# Patient Record
Sex: MALE | Race: WHITE | NOT HISPANIC OR LATINO | Employment: FULL TIME | URBAN - METROPOLITAN AREA
[De-identification: names, ages, dates, MRNs, and addresses within clinical notes are randomized per-mention and may not be internally consistent; named-entity substitution may affect disease eponyms.]

---

## 2018-06-02 ENCOUNTER — OFFICE VISIT (OUTPATIENT)
Dept: URGENT CARE | Facility: CLINIC | Age: 48
End: 2018-06-02
Payer: COMMERCIAL

## 2018-06-02 VITALS
SYSTOLIC BLOOD PRESSURE: 169 MMHG | OXYGEN SATURATION: 99 % | HEART RATE: 85 BPM | TEMPERATURE: 96.4 F | RESPIRATION RATE: 16 BRPM | DIASTOLIC BLOOD PRESSURE: 100 MMHG

## 2018-06-02 DIAGNOSIS — H61.23 BILATERAL IMPACTED CERUMEN: Primary | ICD-10-CM

## 2018-06-02 PROCEDURE — 69209 REMOVE IMPACTED EAR WAX UNI: CPT | Performed by: PHYSICIAN ASSISTANT

## 2018-06-02 PROCEDURE — 99203 OFFICE O/P NEW LOW 30 MIN: CPT | Performed by: PHYSICIAN ASSISTANT

## 2018-06-02 RX ORDER — LISINOPRIL 30 MG/1
TABLET ORAL
COMMUNITY

## 2018-06-02 NOTE — PATIENT INSTRUCTIONS
Cerumen removed from bilateral ears with irrigation  Recommend patient use swimmer's ear over-the-counter to help dry out his ears after the procedure  Recommend over-the-counter Debrox drops to help thin cerumen from both ears on a regular basis

## 2018-06-02 NOTE — PROGRESS NOTES
[facility]    NAME: Taylor Gong is a 50 y o  male  : 1970    MRN: [id number]  DATE: 2018  TIME: 11:49 AM    Assessment and Plan   Bilateral impacted cerumen [H61 23]  1  Bilateral impacted cerumen     Ear cerumen removal  Date/Time: 2018 11:49 AM  Performed by: Monico Tesfaye by: Angelito Azul     Patient location:  Clinic  Consent:     Consent obtained:  Verbal    Consent given by:  Patient    Risks discussed:  Dizziness, incomplete removal and TM perforation    Alternatives discussed:  No treatment  Universal protocol:     Procedure explained and questions answered to patient or proxy's satisfaction: yes      Patient identity confirmed:  Verbally with patient  Procedure details:     Local anesthetic:  None    Location:  L ear and R ear    Procedure type: irrigation      Approach:  External  Post-procedure details:     Complication:  None    Hearing quality:  Improved    Patient tolerance of procedure: Tolerated well, no immediate complications        Patient Instructions     Patient Instructions   Cerumen removed from bilateral ears with irrigation  Recommend patient use swimmer's ear over-the-counter to help dry out his ears after the procedure  Recommend over-the-counter Debrox drops to help thin cerumen from both ears on a regular basis  Chief Complaint     Chief Complaint   Patient presents with    Ear Fullness    Cerumen Impaction       History of Present Illness   28-year-old male here requesting to have his ears cleaned out  He has decreased hearing in his left ear in his left ear feels full  Denies any upper respiratory complaints  Patient has had this happen before  Review of Systems   Review of Systems   Constitutional: Negative for activity change, appetite change, chills, diaphoresis, fatigue, fever and unexpected weight change     HENT: Negative for congestion, ear pain, hearing loss, sinus pressure, sneezing, sore throat and tinnitus  Decreased hearing left ear and ear fullness  Eyes: Negative for photophobia, redness and visual disturbance  Respiratory: Negative for apnea, cough, chest tightness, shortness of breath, wheezing and stridor  Cardiovascular: Negative for chest pain, palpitations and leg swelling  Gastrointestinal: Negative for abdominal distention, abdominal pain, blood in stool, constipation, diarrhea, nausea and vomiting  Endocrine: Negative for cold intolerance, heat intolerance, polydipsia, polyphagia and polyuria  Genitourinary: Negative for difficulty urinating, dysuria, flank pain, hematuria and urgency  Musculoskeletal: Negative for arthralgias, back pain, gait problem, myalgias, neck pain and neck stiffness  Skin: Negative for rash and wound  Neurological: Negative for dizziness, tremors, seizures, syncope, weakness, light-headedness, numbness and headaches  Hematological: Negative for adenopathy  Does not bruise/bleed easily  Psychiatric/Behavioral: Negative for agitation, behavioral problems, confusion and decreased concentration  The patient is not nervous/anxious  All other systems reviewed and are negative  Current Medications     Current Outpatient Prescriptions:     lisinopril (ZESTRIL) 30 mg tablet, Take by mouth, Disp: , Rfl:     Current Allergies     Allergies as of 06/02/2018 - Reviewed 06/02/2018   Allergen Reaction Noted    Penicillins  07/18/2016          The following portions of the patient's history were reviewed and updated as appropriate: allergies, current medications, past family history, past medical history, past social history, past surgical history and problem list    Past Medical History:   Diagnosis Date    Hypertension      No past surgical history on file  No family history on file  Medications have been verified      Objective   /100   Pulse 85   Temp (!) 96 4 °F (35 8 °C)   Resp 16   SpO2 99%      Physical Exam   Physical Exam Constitutional: He appears well-developed and well-nourished  No distress  HENT:   Head: Normocephalic  Right Ear: External ear normal    Left Ear: External ear normal    Nose: Nose normal    Mouth/Throat: Oropharynx is clear and moist  No oropharyngeal exudate  Cerumen impaction bilaterally  TM intact and gray normal bilaterally after irrigation  Neck: Normal range of motion  Neck supple  Cardiovascular: Normal rate, regular rhythm and normal heart sounds  No murmur heard  Pulmonary/Chest: Effort normal and breath sounds normal  No respiratory distress  He has no wheezes  He has no rales  Abdominal: Soft  Bowel sounds are normal  There is no tenderness  Musculoskeletal: Normal range of motion  Lymphadenopathy:     He has no cervical adenopathy  Skin: Skin is warm  No rash noted

## 2019-12-27 ENCOUNTER — OFFICE VISIT (OUTPATIENT)
Dept: URGENT CARE | Facility: CLINIC | Age: 49
End: 2019-12-27
Payer: COMMERCIAL

## 2019-12-27 VITALS
TEMPERATURE: 97.6 F | HEIGHT: 67 IN | HEART RATE: 120 BPM | OXYGEN SATURATION: 96 % | DIASTOLIC BLOOD PRESSURE: 76 MMHG | RESPIRATION RATE: 18 BRPM | SYSTOLIC BLOOD PRESSURE: 122 MMHG | WEIGHT: 170 LBS | BODY MASS INDEX: 26.68 KG/M2

## 2019-12-27 DIAGNOSIS — K52.9 NONINFECTIOUS GASTROENTERITIS, UNSPECIFIED TYPE: Primary | ICD-10-CM

## 2019-12-27 PROCEDURE — 99213 OFFICE O/P EST LOW 20 MIN: CPT | Performed by: PHYSICIAN ASSISTANT

## 2019-12-27 RX ORDER — ALPRAZOLAM 0.25 MG/1
TABLET ORAL
COMMUNITY
Start: 2019-09-26

## 2019-12-27 RX ORDER — ESCITALOPRAM OXALATE 10 MG/1
TABLET ORAL
COMMUNITY
Start: 2019-11-01

## 2019-12-27 NOTE — PROGRESS NOTES
[facility]        NAME: Lauryn Dejesus is a 52 y o  male  : 1970    MRN: [id number]  DATE: 2019  TIME: 8:28 AM    Assessment and Plan   Noninfectious gastroenteritis, unspecified type [K52 9]  1  Noninfectious gastroenteritis, unspecified type           Patient Instructions     Brat diet  Avoid dairy products for 48 hours  Follow up with PCP in 3-5 days  Proceed to  ER if symptoms worsen  Chief Complaint     Chief Complaint   Patient presents with    Diarrhea     C/O abdominal cramping, a lot of gas and abdominal cramping x 2 1/2 days  Pt had 1 episode of vomiting  History of Present Illness       Patient woke up Jenifer Day with a lot of gas abdominal discomfort  Following day he had an episode of vomiting the form stool yesterday he had a formed stool further nausea or vomiting  Then he acquired of watery diarrhea no blood or mucus  Denies any fever chills  He has no appetite but is drinking  No one else in the family has similar symptoms  Review of Systems   Review of Systems   Constitutional: Positive for activity change  Negative for chills and fever  Respiratory: Negative for cough  Gastrointestinal: Positive for abdominal pain and diarrhea  Negative for blood in stool  Musculoskeletal: Negative for myalgias  Skin: Negative for rash  Neurological: Negative for light-headedness  Hematological: Negative for adenopathy           Current Medications       Current Outpatient Medications:     escitalopram (LEXAPRO) 10 mg tablet, , Disp: , Rfl:     lisinopril (ZESTRIL) 30 mg tablet, Take by mouth, Disp: , Rfl:     ALPRAZolam (XANAX) 0 25 mg tablet, , Disp: , Rfl:     Current Allergies     Allergies as of 2019 - Reviewed 2019   Allergen Reaction Noted    Penicillins  2016            The following portions of the patient's history were reviewed and updated as appropriate: allergies, current medications, past family history, past medical history, past social history, past surgical history and problem list      Past Medical History:   Diagnosis Date    Hypertension        History reviewed  No pertinent surgical history  History reviewed  No pertinent family history  Medications have been verified  Objective   /76   Pulse (!) 120   Temp 97 6 °F (36 4 °C) (Tympanic)   Resp 18   Ht 5' 7" (1 702 m)   Wt 77 1 kg (170 lb)   SpO2 96%   BMI 26 63 kg/m²        Physical Exam     Physical Exam   Constitutional: He is oriented to person, place, and time  He appears well-developed and well-nourished  HENT:   Head: Normocephalic and atraumatic  Cardiovascular: Normal rate and regular rhythm  Pulmonary/Chest: Effort normal    Abdominal: Soft  There is tenderness (Mild generalized tenderness  )  Neurological: He is alert and oriented to person, place, and time  Skin: Skin is warm and dry  No rash noted  Psychiatric: He has a normal mood and affect  His behavior is normal    Nursing note and vitals reviewed

## 2019-12-27 NOTE — PATIENT INSTRUCTIONS
Gastroenteritis   AMBULATORY CARE:   Gastroenteritis , or stomach flu, is an infection of the stomach and intestines  It is caused by bacteria, parasites, or viruses  Common symptoms include the following:   · Diarrhea or gas    · Nausea, vomiting, or poor appetite    · Abdominal cramps, pain, or gurgling    · Fever    · Tiredness or weakness    · Headaches or muscle aches with any of the above symptoms  Call 911 for any of the following:   · You have trouble breathing or a very fast pulse  Seek care immediately if:   · You see blood in your diarrhea  · You cannot stop vomiting  · You have not urinated for 12 hours  · You feel like you are going to faint  Contact your healthcare provider if:   · You have a fever  · You continue to vomit or have diarrhea, even after treatment  · You see worms in your diarrhea  · Your mouth or eyes are dry  You are not urinating as much or as often  · You have questions or concerns about your condition or care  Treatment for gastroenteritis  may include medicines to slow or stop your diarrhea or vomiting  You may also need medicines to treat an infection caused by bacteria or a parasite  Manage your symptoms:   · Drink liquids as directed  Ask your healthcare provider how much liquid to drink each day, and which liquids are best for you  You may also need to drink an oral rehydration solution (ORS)  An ORS has the right amounts of sugar, salt, and minerals in water to replace body fluids  · Eat bland foods  When you feel hungry, begin eating soft, bland foods  Examples are bananas, clear soup, potatoes, and applesauce  Do not have dairy products, alcohol, sugary drinks, or drinks with caffeine until you feel better  · Rest as much as possible  Slowly start to do more each day when you begin to feel better  Prevent the spread of germs:  Gastroenteritis can spread easily   Keep yourself, your family, and your surroundings clean to help prevent the spread of gastroenteritis:  · Wash your hands often  Use soap and water  Wash your hands after you use the bathroom, change a child's diapers, or sneeze  Wash your hands before you prepare or eat food  · Clean surfaces and do laundry often  Wash your clothes and towels separately from the rest of the laundry  Clean surfaces in your home with antibacterial  or bleach  · Clean food thoroughly and cook safely  Wash raw vegetables before you cook  Cook meat, fish, and eggs fully  Do not use the same dishes for raw meat as you do for other foods  Refrigerate any leftover food immediately  · Be aware when you camp or travel  Drink only clean water  Do not drink from rivers or lakes unless you purify or boil the water first  When you travel, drink bottled water and do not add ice  Do not eat fruit that has not been peeled  Do not eat raw fish or meat that is not fully cooked  Follow up with your healthcare provider as directed:  Write down your questions so you remember to ask them during your visits  © 2017 [facility] Information is for End User's use only and may not be sold, redistributed or otherwise used for commercial purposes  All illustrations and images included in CareNotes® are the copyrighted property of A D A M , Inc  or Ervin Wild  The above information is an  only  It is not intended as medical advice for individual conditions or treatments  Talk to your doctor, nurse or pharmacist before following any medical regimen to see if it is safe and effective for you

## 2020-02-27 ENCOUNTER — APPOINTMENT (OUTPATIENT)
Dept: RADIOLOGY | Facility: CLINIC | Age: 50
End: 2020-02-27
Payer: COMMERCIAL

## 2020-02-27 ENCOUNTER — TRANSCRIBE ORDERS (OUTPATIENT)
Dept: URGENT CARE | Facility: CLINIC | Age: 50
End: 2020-02-27

## 2020-02-27 DIAGNOSIS — M54.9 BACK PAIN, UNSPECIFIED BACK LOCATION, UNSPECIFIED BACK PAIN LATERALITY, UNSPECIFIED CHRONICITY: ICD-10-CM

## 2020-02-27 DIAGNOSIS — M54.9 BACK PAIN, UNSPECIFIED BACK LOCATION, UNSPECIFIED BACK PAIN LATERALITY, UNSPECIFIED CHRONICITY: Primary | ICD-10-CM

## 2020-02-27 PROCEDURE — 72100 X-RAY EXAM L-S SPINE 2/3 VWS: CPT

## 2020-06-25 ENCOUNTER — TRANSCRIBE ORDERS (OUTPATIENT)
Dept: LAB | Facility: MEDICAL CENTER | Age: 50
End: 2020-06-25

## 2020-06-25 ENCOUNTER — APPOINTMENT (OUTPATIENT)
Dept: LAB | Facility: MEDICAL CENTER | Age: 50
End: 2020-06-25
Payer: COMMERCIAL

## 2020-06-25 DIAGNOSIS — Z20.828 EXPOSURE TO SARS-ASSOCIATED CORONAVIRUS: Primary | ICD-10-CM

## 2020-06-25 DIAGNOSIS — Z20.828 EXPOSURE TO SARS-ASSOCIATED CORONAVIRUS: ICD-10-CM

## 2020-06-25 PROCEDURE — 86769 SARS-COV-2 COVID-19 ANTIBODY: CPT

## 2020-06-25 PROCEDURE — 36415 COLL VENOUS BLD VENIPUNCTURE: CPT

## 2020-06-26 LAB — SARS-COV-2 IGG+IGM SERPL QL IA: NORMAL

## 2021-07-12 ENCOUNTER — HOSPITAL ENCOUNTER (EMERGENCY)
Facility: HOSPITAL | Age: 51
Discharge: HOME | End: 2021-07-12
Attending: EMERGENCY MEDICINE
Payer: COMMERCIAL

## 2021-07-12 VITALS
HEIGHT: 67 IN | HEART RATE: 89 BPM | BODY MASS INDEX: 28.25 KG/M2 | RESPIRATION RATE: 16 BRPM | DIASTOLIC BLOOD PRESSURE: 103 MMHG | OXYGEN SATURATION: 99 % | SYSTOLIC BLOOD PRESSURE: 195 MMHG | TEMPERATURE: 97.1 F | WEIGHT: 180 LBS

## 2021-07-12 DIAGNOSIS — K04.7 DENTAL INFECTION: Primary | ICD-10-CM

## 2021-07-12 DIAGNOSIS — R59.1 LYMPHADENOPATHY: ICD-10-CM

## 2021-07-12 PROCEDURE — 99283 EMERGENCY DEPT VISIT LOW MDM: CPT

## 2021-07-12 PROCEDURE — 99284 EMERGENCY DEPT VISIT MOD MDM: CPT | Performed by: EMERGENCY MEDICINE

## 2021-07-12 RX ORDER — CLINDAMYCIN HYDROCHLORIDE 300 MG/1
300 CAPSULE ORAL 4 TIMES DAILY
Qty: 40 CAPSULE | Refills: 0 | Status: SHIPPED | OUTPATIENT
Start: 2021-07-12 | End: 2021-07-22

## 2021-07-12 RX ORDER — CLINDAMYCIN HYDROCHLORIDE 150 MG/1
450 CAPSULE ORAL ONCE
Status: COMPLETED | OUTPATIENT
Start: 2021-07-12 | End: 2021-07-12

## 2021-07-12 RX ADMIN — CLINDAMYCIN HYDROCHLORIDE 450 MG: 150 CAPSULE ORAL at 03:48

## 2021-07-12 NOTE — DISCHARGE INSTRUCTIONS
RETURN IF WORSE IN ANY WAY, OR NEW AND CONCERNING SYMPTOMS SIGNS OR SYMPTOMS:  INCREASED PAIN, INCREASED SWELLING, FEVER OR FLU LIKE SYMPTOMS    TREATMENT OPTIONS FOR DENTAL PAIN, WHILE YOU WAIT TO BE SEEN BY YOUR DENTIST:  APPLY ICE AS NEEDED  YOU CAN TAKE TYLENOL AND MOTRIN, AS NEEDED, AND AS DIRECTED (Do not take Motrin if you have Kidney or Ulcer disease; do not take Tylenol if you have Liver disease, use caution as many OTC pain medications contain Tylenol, and use of different or multiple products can cause Tylenol overdose, which can be life threatening)  Orajel, or similar OTC products can be very helpful when applied locally  Peppermint Leaves, Peppermint Extract, or Peppermint Tea: when applied locally have been shown to reduce pain  Vanilla, [place], Or Lemon Extract: when applied to a Q-Tip and applied locally, have been shown to reduce pain  Clove Oil, Tea Tree Oil applied locally can reduce pain  Citrus fruits, when applied locally, can reduce pain    PLEASE CALL YOUR DENTIST TO SET UP AN APPOINTMENT - YOU MAY WANT TO TRY MULTIPLE PRACTICES TO TRY TO OBTAIN AN URGENT EVALUATION    PLEASE CALL YOUR PRIMARY DOCTOR IN THE MORNING TO SET UP FOLLOW UP IF YOU CANNOT BE SEEN BY A DENTIST IN A TIMELY MANNER    PLEASE HAVE YOUR FAMILY DOCTOR RE-EVALUATE YOUR BLOOD PRESSURE, TODAY IT WAS ELEVATED       PATIENT SURVEY:   Thank you for your visit today  Your satisfaction is very very important to us  If you get a survey in the mail please rate your service as VERY GOOD, This helps our team to continue providing excellent care

## 2021-07-13 NOTE — ED PROVIDER NOTES
History  Chief Complaint   Patient presents with    Neck Swelling     started this morning, no assiciated complaints       47 yo M    Here for left submandibular LA and Left lower canine tooth infection   Pt has mild pain due to the LA    No fever/chills  No night sweats  No loss of weight    No other complaints  No cp or sob  No headache or dizziness  No neck pain or stiffness            History provided by:  Patient  Medical Problem  Location:  Lympadenopathy, left lower submandibular region w/ dental abscess  Severity:  Mild  Onset quality:  Gradual  Chronicity:  New  Associated symptoms: no abdominal pain, no chest pain, no congestion, no cough, no diarrhea, no ear pain, no fatigue, no fever, no headaches, no loss of consciousness, no myalgias, no nausea, no rash, no rhinorrhea, no shortness of breath, no sore throat, no vomiting and no wheezing        Prior to Admission Medications   Prescriptions Last Dose Informant Patient Reported? Taking? ALPRAZolam (XANAX) 0 25 mg tablet Not Taking at Unknown time  Yes No   Patient not taking: Reported on 7/12/2021   escitalopram (LEXAPRO) 10 mg tablet 7/11/2021 at Unknown time  Yes Yes   lisinopril (ZESTRIL) 30 mg tablet 7/11/2021 at Unknown time  Yes Yes   Sig: Take by mouth      Facility-Administered Medications: None       Past Medical History:   Diagnosis Date    Hypertension        Past Surgical History:   Procedure Laterality Date    TONSILECTOMY AND ADNOIDECTOMY         History reviewed  No pertinent family history  I have reviewed and agree with the history as documented  E-Cigarette/Vaping     E-Cigarette/Vaping Substances     Social History     Tobacco Use    Smoking status: Current Every Day Smoker     Packs/day: 1 00     Types: Cigarettes    Smokeless tobacco: Never Used   Substance Use Topics    Alcohol use: Never    Drug use: Never       Review of Systems   Constitutional: Negative for fatigue and fever     HENT: Positive for dental problem (left lower canine infection w/ associated LA in the submandibular region )  Negative for congestion, ear pain, rhinorrhea and sore throat  Respiratory: Negative for cough, shortness of breath and wheezing  Cardiovascular: Negative for chest pain  Gastrointestinal: Negative for abdominal pain, diarrhea, nausea and vomiting  Musculoskeletal: Negative for back pain, myalgias, neck pain and neck stiffness  Skin: Negative for rash  Neurological: Negative for dizziness, loss of consciousness and headaches  All other systems reviewed and are negative  Physical Exam  Physical Exam  Constitutional:       General: He is not in acute distress  Appearance: He is well-developed  He is not ill-appearing, toxic-appearing or diaphoretic  HENT:      Head: Normocephalic and atraumatic  Right Ear: External ear normal       Left Ear: External ear normal       Nose: Nose normal       Mouth/Throat:      Mouth: Mucous membranes are moist       Pharynx: Oropharynx is clear  No oropharyngeal exudate or posterior oropharyngeal erythema  Comments: Left lower canine tooth moderate to severe decay  Left submandibular singular enlarged LN, 4mm in size w/o associated erythema   Eyes:      General: No scleral icterus  Right eye: No discharge  Left eye: No discharge  Conjunctiva/sclera: Conjunctivae normal       Pupils: Pupils are equal, round, and reactive to light  Neck:      Vascular: No JVD  Trachea: No tracheal deviation  Cardiovascular:      Rate and Rhythm: Normal rate and regular rhythm  Heart sounds: Normal heart sounds  No murmur heard  No friction rub  No gallop  Pulmonary:      Effort: Pulmonary effort is normal  No respiratory distress  Breath sounds: Normal breath sounds  No stridor  No wheezing, rhonchi or rales  Chest:      Chest wall: No tenderness  Abdominal:      General: Bowel sounds are normal  There is no distension        Palpations: Abdomen is soft  There is no mass  Tenderness: There is no abdominal tenderness  There is no right CVA tenderness, left CVA tenderness, guarding or rebound  Hernia: No hernia is present  Musculoskeletal:         General: No swelling, tenderness, deformity or signs of injury  Normal range of motion  Cervical back: Normal range of motion and neck supple  No rigidity or tenderness  Right lower leg: No edema  Left lower leg: No edema  Lymphadenopathy:      Cervical: No cervical adenopathy  Skin:     General: Skin is warm  Capillary Refill: Capillary refill takes less than 2 seconds  Coloration: Skin is not jaundiced or pale  Findings: No bruising, erythema, lesion or rash  Neurological:      General: No focal deficit present  Mental Status: He is alert and oriented to person, place, and time  Mental status is at baseline  Cranial Nerves: No cranial nerve deficit  Sensory: No sensory deficit  Motor: No weakness or abnormal muscle tone  Coordination: Coordination normal       Gait: Gait normal    Psychiatric:         Mood and Affect: Mood normal          Behavior: Behavior normal          Thought Content:  Thought content normal          Judgment: Judgment normal          Vital Signs  ED Triage Vitals [07/12/21 0331]   Temperature Pulse Respirations Blood Pressure SpO2   (!) 97 1 °F (36 2 °C) 89 16 (!) 195/103 99 %      Temp Source Heart Rate Source Patient Position - Orthostatic VS BP Location FiO2 (%)   Tympanic Monitor Sitting Left arm --      Pain Score       --           Vitals:    07/12/21 0331   BP: (!) 195/103   Pulse: 89   Patient Position - Orthostatic VS: Sitting         Visual Acuity      ED Medications  Medications   clindamycin (CLEOCIN) capsule 450 mg (450 mg Oral Given 7/12/21 8051)       Diagnostic Studies  Results Reviewed     None                 No orders to display              Procedures  Procedures         ED Course  ED Course as of Jul 13 0781   Mon Jul 12, 2021   3101 Pt has dental infection, left lower canine  Has associated LA  Will tx empirically   Pt understands return precautions   He already has plans for dentist f/u       79 770 20 12 Pts BP has been in similar ranges in the past   There are no clinical criteria to tx pt's asymptomatic hypertension   Pt aware that he needs to have f/u w/ PCP for BP check                                               MDM    Disposition  Final diagnoses:   Dental infection   Lymphadenopathy     Time reflects when diagnosis was documented in both MDM as applicable and the Disposition within this note     Time User Action Codes Description Comment    7/12/2021  3:45 AM Ghislainerie Colla Add [K04 7] Dental infection     7/12/2021  3:45 AM Florrie Colla Add [R59 1] Lymphadenopathy       ED Disposition     ED Disposition Condition Date/Time Comment    Discharge Stable Mon Jul 12, 2021  3:45 AM Paula Acevedo discharge to home/self care  Follow-up Information     Follow up With Specialties Details Why Contact Info    Mai Jacob MD [facility] Medicine Call today  [place]io [phone] [facility] [phone]            Discharge Medication List as of 7/12/2021  3:48 AM      START taking these medications    Details   clindamycin (CLEOCIN) 300 MG capsule Take 1 capsule (300 mg total) by mouth 4 (four) times a day for 10 days, Starting Mon 7/12/2021, Until Thu 7/22/2021, Normal         CONTINUE these medications which have NOT CHANGED    Details   escitalopram (LEXAPRO) 10 mg tablet Starting Fri 11/1/2019, Historical Med      lisinopril (ZESTRIL) 30 mg tablet Take by mouth, Historical Med      ALPRAZolam (XANAX) 0 25 mg tablet Starting Thu 9/26/2019, Historical Med           No discharge procedures on file      PDMP Review     None          ED Provider  Electronically Signed by           Amanda Macario MD  07/13/21 0111

## 2021-08-02 ENCOUNTER — APPOINTMENT (OUTPATIENT)
Dept: RADIOLOGY | Facility: CLINIC | Age: 51
End: 2021-08-02
Payer: COMMERCIAL

## 2021-08-02 ENCOUNTER — OFFICE VISIT (OUTPATIENT)
Dept: URGENT CARE | Facility: CLINIC | Age: 51
End: 2021-08-02
Payer: COMMERCIAL

## 2021-08-02 VITALS
RESPIRATION RATE: 20 BRPM | HEART RATE: 104 BPM | SYSTOLIC BLOOD PRESSURE: 164 MMHG | TEMPERATURE: 97.8 F | OXYGEN SATURATION: 96 % | DIASTOLIC BLOOD PRESSURE: 97 MMHG | WEIGHT: 180 LBS | BODY MASS INDEX: 28.19 KG/M2

## 2021-08-02 DIAGNOSIS — M25.512 ACUTE PAIN OF LEFT SHOULDER: ICD-10-CM

## 2021-08-02 DIAGNOSIS — M89.8X1 PAIN OF LEFT SCAPULA: ICD-10-CM

## 2021-08-02 DIAGNOSIS — M25.512 ACUTE PAIN OF LEFT SHOULDER: Primary | ICD-10-CM

## 2021-08-02 PROCEDURE — 99213 OFFICE O/P EST LOW 20 MIN: CPT | Performed by: PHYSICIAN ASSISTANT

## 2021-08-02 PROCEDURE — 73010 X-RAY EXAM OF SHOULDER BLADE: CPT

## 2021-08-02 PROCEDURE — 73030 X-RAY EXAM OF SHOULDER: CPT

## 2021-08-02 RX ORDER — MELOXICAM 15 MG/1
15 TABLET ORAL DAILY
Qty: 14 TABLET | Refills: 0 | Status: SHIPPED | OUTPATIENT
Start: 2021-08-02

## 2021-08-02 RX ORDER — METHOCARBAMOL 750 MG/1
750 TABLET, FILM COATED ORAL EVERY 6 HOURS PRN
Qty: 30 TABLET | Refills: 0 | Status: SHIPPED | OUTPATIENT
Start: 2021-08-02

## 2021-08-02 NOTE — PROGRESS NOTES
[facility]        NAME: Annie Hernandez is a 46 y o  male  : 1970    MRN: [id number]  DATE: 2021  TIME: 8:33 AM    Assessment and Plan   Acute pain of left shoulder [M25 512]  1  Acute pain of left shoulder  XR shoulder 2+ vw left   2  Pain of left scapula  XR scapula left         Patient Instructions       Follow up with PCP in 3-5 days  Proceed to  ER if symptoms worsen  Chief Complaint     Chief Complaint   Patient presents with    Shoulder Pain     Left, fell 2 weeks ago , not improving         History of Present Illness         Patient believes he fell 2 Sundays ago in the parking lot of the convenience store across the street  He fell on to his left side and is complaining of left shoulder pain  States his pain went away for 4 days and returned  States when his pain occurs it is causing neck strain and pain radiates into the proximal aspect of his left upper arm causing "spasms"  Review of Systems   Review of Systems   Constitutional: Negative for chills and fever  Respiratory: Negative for cough  Skin: Negative for rash  Neurological: Negative for weakness and numbness           Current Medications       Current Outpatient Medications:     escitalopram (LEXAPRO) 10 mg tablet, , Disp: , Rfl:     lisinopril (ZESTRIL) 30 mg tablet, Take by mouth, Disp: , Rfl:     ALPRAZolam (XANAX) 0 25 mg tablet, , Disp: , Rfl:     Current Allergies     Allergies as of 2021 - Reviewed 2021   Allergen Reaction Noted    Penicillins  2016            The following portions of the patient's history were reviewed and updated as appropriate: allergies, current medications, past family history, past medical history, past social history, past surgical history and problem list      Past Medical History:   Diagnosis Date    Anxiety     Depression     Hypertension        Past Surgical History:   Procedure Laterality Date    TONSILECTOMY AND ADNOIDECTOMY History reviewed  No pertinent family history  Medications have been verified  Objective   /97   Pulse 104   Temp 97 8 °F (36 6 °C)   Resp 20   Wt 81 6 kg (180 lb)   SpO2 96%   BMI 28 19 kg/m²   No LMP for male patient  Physical Exam     Physical Exam  Vitals and nursing note reviewed  Constitutional:       Appearance: Normal appearance  HENT:      Head: Normocephalic and atraumatic  Cardiovascular:      Rate and Rhythm: Normal rate and regular rhythm  Pulmonary:      Effort: Pulmonary effort is normal    Musculoskeletal:      Comments: Neck without any ecchymosis rashes or wounds  Tenderness of the left trapezius muscle  No palpable spasm  Left shoulder without any swelling ecchymosis rashes or wounds  Full range of motion of left shoulder painful range of motion  Negative empty can sign, able to place his arm behind his back  Tenderness over the posterior aspect of the shoulder into the left scapula  No winged scapula  Tenderness of the left deltoid muscle  No palpable spasm  Skin:     General: Skin is warm  Neurological:      Mental Status: He is alert  Shoulder x-ray -  No acute bony abnormality  Questionable degenerative changes at the [facility] joint  Scapula x-ray -  No acute bony abnormality

## 2021-08-02 NOTE — PATIENT INSTRUCTIONS
Start Mobic and Robaxin as prescribed  If no improvement follow up Orthopedics  Arthralgia   WHAT YOU NEED TO KNOW:   Arthralgia is pain in one or more joints, with no inflammation  It may be short-term and get better within 6 to 8 weeks  Arthralgia can be an early sign of arthritis  Arthralgia may be caused by a medical condition, such as a hormone disorder or a tumor  It may also be caused by an infection or injury  DISCHARGE INSTRUCTIONS:   Medicines: The following medicines may  be ordered for you:  · Acetaminophen  decreases pain  Ask how much to take and how often to take it  Follow directions  Acetaminophen can cause liver damage if not taken correctly  · NSAIDs  decrease pain and prevent swelling  Ask your healthcare provider which medicine is right for you  Ask how much to take and when to take it  Take as directed  NSAIDs can cause stomach bleeding and kidney problems if not taken correctly  · Pain relief cream  decreases pain  Use this cream as directed  · Take your medicine as directed  Contact your healthcare provider if you think your medicine is not helping or if you have side effects  Tell him of her if you are allergic to any medicine  Keep a list of the medicines, vitamins, and herbs you take  Include the amounts, and when and why you take them  Bring the list or the pill bottles to follow-up visits  Carry your medicine list with you in case of an emergency  Follow up with your healthcare provider or specialist as directed:  Write down your questions so you remember to ask them during your visits  Self-care:   · Apply heat  to help decrease pain  Use a heating pad or heat wrap  Apply heat for 20 to 30 minutes every 2 hours for as many days as directed  · Rest  as much as possible  Avoid activities that cause joint pain  · Apply ice  to help decrease swelling and pain  Ice may also help prevent tissue damage  Use an ice pack, or put crushed ice in a plastic bag  Cover it with a towel and place it on your painful joint for 15 to 20 minutes every hour or as directed  · Support  the joint with a brace or elastic wrap as directed  · Elevate  your joint above the level of your heart as often as you can to help decrease swelling and pain  Prop your painful joint on pillows or blankets to keep it elevated comfortably  · Lose weight  if you are overweight  Extra weight can put pressure on your joints and cause more pain  Ask your healthcare provider how much you should weigh  Ask him to help you create a weight loss plan  · Exercise  regularly to help improve joint movement and to decrease pain  Ask about the best exercise plan for you  Low-impact exercises can help take the pressure off your joints  Examples are walking, swimming, and water aerobics  Physical therapy:  A physical therapist teaches you exercises to help improve movement and strength, and to decrease pain  Ask your healthcare provider if physical therapy is right for you  Contact your healthcare provider or specialist if:   · You have a fever  · You continue to have joint pain that cannot be relieved with heat, ice, or medicine  · You have pain and inflammation around your joint  · You have questions or concerns about your condition or care  Return to the emergency department if:   · You have sudden, severe pain when you move your joint  · You have a fever and shaking chills  · You cannot move your joint  · You lose feeling on the side of your body where you have the painful joint  © Copyright [employer] 2021 Information is for End User's use only and may not be sold, redistributed or otherwise used for commercial purposes  All illustrations and images included in CareNotes® are the copyrighted property of [employer]  or Carrie Womack  The above information is an  only   It is not intended as medical advice for individual conditions or treatments  Talk to your doctor, nurse or pharmacist before following any medical regimen to see if it is safe and effective for you

## 2022-08-24 ENCOUNTER — OFFICE VISIT (OUTPATIENT)
Dept: URGENT CARE | Facility: CLINIC | Age: 52
End: 2022-08-24
Payer: COMMERCIAL

## 2022-08-24 ENCOUNTER — APPOINTMENT (EMERGENCY)
Dept: CT IMAGING | Facility: HOSPITAL | Age: 52
End: 2022-08-24
Payer: COMMERCIAL

## 2022-08-24 ENCOUNTER — HOSPITAL ENCOUNTER (EMERGENCY)
Facility: HOSPITAL | Age: 52
Discharge: HOME | End: 2022-08-25
Attending: EMERGENCY MEDICINE
Payer: COMMERCIAL

## 2022-08-24 VITALS
WEIGHT: 180 LBS | SYSTOLIC BLOOD PRESSURE: 158 MMHG | TEMPERATURE: 98 F | DIASTOLIC BLOOD PRESSURE: 98 MMHG | BODY MASS INDEX: 28.19 KG/M2 | RESPIRATION RATE: 20 BRPM | OXYGEN SATURATION: 98 % | HEART RATE: 104 BPM

## 2022-08-24 VITALS
BODY MASS INDEX: 28.25 KG/M2 | RESPIRATION RATE: 18 BRPM | OXYGEN SATURATION: 99 % | HEART RATE: 98 BPM | SYSTOLIC BLOOD PRESSURE: 165 MMHG | HEIGHT: 67 IN | WEIGHT: 180 LBS | DIASTOLIC BLOOD PRESSURE: 108 MMHG

## 2022-08-24 DIAGNOSIS — K08.9 POOR DENTITION: ICD-10-CM

## 2022-08-24 DIAGNOSIS — H60.313 ACUTE DIFFUSE OTITIS EXTERNA OF BOTH EARS: Primary | ICD-10-CM

## 2022-08-24 DIAGNOSIS — R22.1 NECK SWELLING: ICD-10-CM

## 2022-08-24 DIAGNOSIS — H61.23 BILATERAL HEARING LOSS DUE TO CERUMEN IMPACTION: ICD-10-CM

## 2022-08-24 DIAGNOSIS — K11.20 SIALADENITIS: Primary | ICD-10-CM

## 2022-08-24 DIAGNOSIS — R59.0 LYMPHADENOPATHY, ANTERIOR CERVICAL: ICD-10-CM

## 2022-08-24 PROCEDURE — G1004 CDSM NDSC: HCPCS

## 2022-08-24 PROCEDURE — 99213 OFFICE O/P EST LOW 20 MIN: CPT | Performed by: NURSE PRACTITIONER

## 2022-08-24 PROCEDURE — 69209 REMOVE IMPACTED EAR WAX UNI: CPT | Performed by: NURSE PRACTITIONER

## 2022-08-24 PROCEDURE — 69200 CLEAR OUTER EAR CANAL: CPT | Performed by: NURSE PRACTITIONER

## 2022-08-24 PROCEDURE — 99284 EMERGENCY DEPT VISIT MOD MDM: CPT

## 2022-08-24 PROCEDURE — 99284 EMERGENCY DEPT VISIT MOD MDM: CPT | Performed by: EMERGENCY MEDICINE

## 2022-08-24 PROCEDURE — 70490 CT SOFT TISSUE NECK W/O DYE: CPT

## 2022-08-24 PROCEDURE — 70486 CT MAXILLOFACIAL W/O DYE: CPT

## 2022-08-24 NOTE — PATIENT INSTRUCTIONS
You have been given ear drops for bilateral ear canal infection    You are to use debrox drops monthly to prevent future severe impactions  You are NOT to use Q tips  You are to see your PCP for possible blood work related to swollen lymphnode  Go to the ED if symptoms worsen

## 2022-08-24 NOTE — PROGRESS NOTES
[facility]        NAME: Nathan Esqueda is a 46 y o  male  : 1970    MRN: [id number]  DATE: 2022  TIME: 2:51 PM    Assessment and Plan   Acute diffuse otitis externa of both ears [H60 313]  1  Acute diffuse otitis externa of both ears  neomycin-polymyxin-hydrocortisone (CORTISPORIN) otic solution   2  Bilateral hearing loss due to cerumen impaction     3  Lymphadenopathy, anterior cervical           Patient Instructions       Follow up with PCP in 3-5 days  Proceed to  ER if symptoms worsen  You have been given ear drops for bilateral ear canal infection  You are to use debrox drops monthly to prevent future severe impactions  You are NOT to use Q tips  You are to see your PCP for possible blood work related to swollen lymphnode  Go to the ED if symptoms worsen        Chief Complaint     Chief Complaint   Patient presents with    Ear Fullness     Swollen nodes neck area         History of Present Illness       This is a 46year old male who states has bilateral ear fullness for a few days, does admit to cerumen issues and uses Q tips  He states that he also has swollen lymphnodes on the right side of his neck  He states that the swollen lymphnodes usually happen when the ears get blocked  He states that he has no other symptoms  Ear Fullness         Review of Systems   Review of Systems   Constitutional: Negative  HENT: Positive for ear pain  Eyes: Negative  Respiratory: Negative  Cardiovascular: Negative  Gastrointestinal: Negative  Endocrine:        Lymphadenopathy    Genitourinary: Negative  Musculoskeletal: Negative  Skin: Negative  Allergic/Immunologic: Negative  Neurological: Negative  Hematological: Negative  Psychiatric/Behavioral: Negative            Current Medications       Current Outpatient Medications:     lisinopril (ZESTRIL) 30 mg tablet, Take by mouth, Disp: , Rfl:     neomycin-polymyxin-hydrocortisone (CORTISPORIN) otic solution, Administer 4 drops into both ears every 6 (six) hours for 7 days, Disp: 10 mL, Rfl: 0    ALPRAZolam (XANAX) 0 25 mg tablet, , Disp: , Rfl:     escitalopram (LEXAPRO) 10 mg tablet, , Disp: , Rfl:     meloxicam (MOBIC) 15 mg tablet, Take 1 tablet (15 mg total) by mouth daily (Patient not taking: Reported on 8/24/2022), Disp: 14 tablet, Rfl: 0    methocarbamol (ROBAXIN) 750 mg tablet, Take 1 tablet (750 mg total) by mouth every 6 (six) hours as needed for muscle spasms (Patient not taking: Reported on 8/24/2022), Disp: 30 tablet, Rfl: 0    Current Allergies     Allergies as of 08/24/2022 - Reviewed 08/24/2022   Allergen Reaction Noted    Penicillins  07/18/2016            The following portions of the patient's history were reviewed and updated as appropriate: allergies, current medications, past family history, past medical history, past social history, past surgical history and problem list      Past Medical History:   Diagnosis Date    Anxiety     Depression     Hypertension        Past Surgical History:   Procedure Laterality Date    TONSILECTOMY AND ADNOIDECTOMY         History reviewed  No pertinent family history  Medications have been verified  Objective   /98   Pulse 104   Temp 98 °F (36 7 °C)   Resp 20   Wt 81 6 kg (180 lb)   SpO2 98%   BMI 28 19 kg/m²   No LMP for male patient  Physical Exam     Physical Exam  Vitals and nursing note reviewed  Constitutional:       General: He is not in acute distress  Appearance: Normal appearance  He is normal weight  He is not ill-appearing, toxic-appearing or diaphoretic  HENT:      Head: Normocephalic and atraumatic  Right Ear: There is impacted cerumen  Left Ear: There is impacted cerumen  Nose: Nose normal  No congestion or rhinorrhea  Mouth/Throat:      Mouth: Mucous membranes are moist       Pharynx: Oropharynx is clear  No oropharyngeal exudate or posterior oropharyngeal erythema  Eyes:      Extraocular Movements: Extraocular movements intact  Cardiovascular:      Rate and Rhythm: Normal rate and regular rhythm  Pulses: Normal pulses  Heart sounds: Normal heart sounds  Pulmonary:      Effort: Pulmonary effort is normal       Breath sounds: Normal breath sounds  Abdominal:      General: There is no distension  Palpations: Abdomen is soft  Tenderness: There is no abdominal tenderness  Musculoskeletal:         General: Normal range of motion  Cervical back: Normal range of motion and neck supple  Lymphadenopathy:      Cervical: Cervical adenopathy present  Comments: Anterior cervical   lymphnode approximately 5 cm in diameter  Able to visualize when standing looking at pt    Skin:     General: Skin is warm and dry  Capillary Refill: Capillary refill takes less than 2 seconds  Neurological:      General: No focal deficit present  Mental Status: He is alert and oriented to person, place, and time  Psychiatric:         Mood and Affect: Mood normal          Behavior: Behavior normal          Thought Content: Thought content normal          Judgment: Judgment normal          Ear cerumen removal    Date/Time: 8/24/2022 2:40 PM  Performed by: MAYO Antonio  Authorized by: MAYO Antonio   Universal Protocol:  Consent: Verbal consent obtained  Written consent obtained  Risks and benefits: risks, benefits and alternatives were discussed  Consent given by: patient  Time out: Immediately prior to procedure a "time out" was called to verify the correct patient, procedure, equipment, support staff and site/side marked as required    Timeout called at: 8/24/2022 2:40 PM   Patient understanding: patient states understanding of the procedure being performed  Patient consent: the patient's understanding of the procedure matches consent given  Procedure consent: procedure consent matches procedure scheduled  Relevant documents: relevant documents present and verified  Test results: test results available and properly labeled  Site marked: the operative site was marked  Required items: required blood products, implants, devices, and special equipment available  Patient identity confirmed: verbally with patient and hospital-assigned identification number      Patient location:  Clinic  Procedure details:     Local anesthetic:  None    Location:  L ear and R ear    Procedure type: irrigation only      Procedure type comment:  Curette     Approach:  External    Visualization (free text):  Copious amounts of cerumen impacted   Post-procedure details:     Complication:  None    Hearing quality:  Improved    Patient tolerance of procedure:   Tolerated well, no immediate complications  Comments:      Copious amounts of cerumen removed from both ears  Canals are infected

## 2022-08-25 ENCOUNTER — APPOINTMENT (EMERGENCY)
Dept: CT IMAGING | Facility: HOSPITAL | Age: 52
End: 2022-08-25
Payer: COMMERCIAL

## 2022-08-25 LAB
BASOPHILS # BLD MANUAL: 0 THOUSAND/UL (ref 0–0.1)
BASOPHILS NFR MAR MANUAL: 0 % (ref 0–1)
EOSINOPHIL # BLD MANUAL: 0.28 THOUSAND/UL (ref 0–0.4)
EOSINOPHIL NFR BLD MANUAL: 2 % (ref 0–6)
ERYTHROCYTE [DISTWIDTH] IN BLOOD BY AUTOMATED COUNT: 13.2 % (ref 11.6–15.1)
HCT VFR BLD AUTO: 42.3 % (ref 36.5–49.3)
HGB BLD-MCNC: 14.4 G/DL (ref 12–17)
LYMPHOCYTES # BLD AUTO: 41 % (ref 14–44)
LYMPHOCYTES # BLD AUTO: 5.83 THOUSAND/UL (ref 0.6–4.47)
MCH RBC QN AUTO: 31.2 PG (ref 26.8–34.3)
MCHC RBC AUTO-ENTMCNC: 34 G/DL (ref 31.4–37.4)
MCV RBC AUTO: 92 FL (ref 82–98)
MONOCYTES # BLD AUTO: 0.85 THOUSAND/UL (ref 0–1.22)
MONOCYTES NFR BLD: 6 % (ref 4–12)
NEUTROPHILS # BLD MANUAL: 7.25 THOUSAND/UL (ref 1.85–7.62)
NEUTS BAND NFR BLD MANUAL: 1 % (ref 0–8)
NEUTS SEG NFR BLD AUTO: 50 % (ref 43–75)
PLATELET # BLD AUTO: 343 THOUSANDS/UL (ref 149–390)
PLATELET BLD QL SMEAR: ADEQUATE
PMV BLD AUTO: 8.7 FL (ref 8.9–12.7)
RBC # BLD AUTO: 4.61 MILLION/UL (ref 3.88–5.62)
RBC MORPH BLD: NORMAL
WBC # BLD AUTO: 14.22 THOUSAND/UL (ref 4.31–10.16)

## 2022-08-25 PROCEDURE — 85027 COMPLETE CBC AUTOMATED: CPT | Performed by: EMERGENCY MEDICINE

## 2022-08-25 PROCEDURE — 36415 COLL VENOUS BLD VENIPUNCTURE: CPT | Performed by: EMERGENCY MEDICINE

## 2022-08-25 PROCEDURE — 85007 BL SMEAR W/DIFF WBC COUNT: CPT | Performed by: EMERGENCY MEDICINE

## 2022-08-25 PROCEDURE — 70491 CT SOFT TISSUE NECK W/DYE: CPT

## 2022-08-25 PROCEDURE — G1004 CDSM NDSC: HCPCS

## 2022-08-25 RX ORDER — CLINDAMYCIN HYDROCHLORIDE 150 MG/1
450 CAPSULE ORAL ONCE
Status: COMPLETED | OUTPATIENT
Start: 2022-08-25 | End: 2022-08-25

## 2022-08-25 RX ORDER — CLINDAMYCIN HYDROCHLORIDE 150 MG/1
450 CAPSULE ORAL 3 TIMES DAILY
Qty: 90 CAPSULE | Refills: 0 | Status: SHIPPED | OUTPATIENT
Start: 2022-08-25 | End: 2022-09-04

## 2022-08-25 RX ADMIN — IOHEXOL 65 ML: 350 INJECTION, SOLUTION INTRAVENOUS at 01:21

## 2022-08-25 RX ADMIN — CLINDAMYCIN HYDROCHLORIDE 450 MG: 150 CAPSULE ORAL at 02:23

## 2022-08-25 NOTE — ED PROVIDER NOTES
History  Chief Complaint   Patient presents with    Medical Problem     Has had swelling of neck since yesterday and started on the right side of the neck and this morning it has gone all to the front of the neck  58-year-old male with no significant past medical history presents the emergency department for evaluation of neck swelling  Patient reports that earlier in the day was evaluated at an urgent care for bilateral ear fullness and cervical adenopathy  Patient states he does have a history of cerumen impaction and has had swollen lymph nodes secondary to that before  He does use Q-tips daily  While at the Urgent Care, has cerumen removal performed and tolerated the procedure well without complication  He states since being home from that visit, states the swelling in the right side of his neck has worsened  He denies any headache, fever, chills, neck pain  Denies any sore throat, voice change, or difficulty tolerating secretions  Denies any ear pain or fullness  No chest pain, cough, nausea or vomiting  Does have poor dentition, but denies any dental pain  Prior to Admission Medications   Prescriptions Last Dose Informant Patient Reported? Taking?    ALPRAZolam (XANAX) 0 25 mg tablet   Yes No   Patient not taking: No sig reported   escitalopram (LEXAPRO) 10 mg tablet   Yes No   Patient not taking: Reported on 8/24/2022   lisinopril (ZESTRIL) 30 mg tablet   Yes No   Sig: Take by mouth   meloxicam (MOBIC) 15 mg tablet   No No   Sig: Take 1 tablet (15 mg total) by mouth daily   Patient not taking: Reported on 8/24/2022   methocarbamol (ROBAXIN) 750 mg tablet   No No   Sig: Take 1 tablet (750 mg total) by mouth every 6 (six) hours as needed for muscle spasms   Patient not taking: Reported on 8/24/2022   neomycin-polymyxin-hydrocortisone (CORTISPORIN) otic solution   No No   Sig: Administer 4 drops into both ears every 6 (six) hours for 7 days      Facility-Administered Medications: None       Past Medical History:   Diagnosis Date    Anxiety     Depression     Hypertension        Past Surgical History:   Procedure Laterality Date    TONSILECTOMY AND ADNOIDECTOMY         History reviewed  No pertinent family history  I have reviewed and agree with the history as documented  E-Cigarette/Vaping    E-Cigarette Use Never User      E-Cigarette/Vaping Substances     Social History     Tobacco Use    Smoking status: Current Every Day Smoker     Packs/day: 1 00     Types: Cigarettes    Smokeless tobacco: Never Used   Vaping Use    Vaping Use: Never used   Substance Use Topics    Alcohol use: Yes     Comment: social     Drug use: Never       Review of Systems   Constitutional: Negative for chills and fever  HENT: Negative for ear pain and sore throat  Eyes: Negative for pain and visual disturbance  Respiratory: Negative for cough and shortness of breath  Cardiovascular: Negative for chest pain and palpitations  Gastrointestinal: Negative for abdominal pain, nausea and vomiting  Genitourinary: Negative for dysuria and hematuria  Musculoskeletal: Negative for arthralgias, back pain, neck pain and neck stiffness  Neck swelling   Skin: Negative for color change and rash  Neurological: Negative for seizures and syncope  Hematological: Positive for adenopathy (Cervical)  All other systems reviewed and are negative  Physical Exam  Physical Exam  Vitals and nursing note reviewed  Constitutional:       General: He is not in acute distress  HENT:      Head: Normocephalic and atraumatic  Right Ear: Tympanic membrane, ear canal and external ear normal  There is no impacted cerumen  Left Ear: Tympanic membrane, ear canal and external ear normal  There is no impacted cerumen  Nose: Nose normal       Mouth/Throat:      Mouth: Mucous membranes are moist       Pharynx: No oropharyngeal exudate or posterior oropharyngeal erythema        Comments: Poor dentition, multiple missing teeth, no obvious abscess present, uvula is midline, no tongue elevation, submental space is soft  Eyes:      Extraocular Movements: Extraocular movements intact  Conjunctiva/sclera: Conjunctivae normal       Pupils: Pupils are equal, round, and reactive to light  Neck:      Comments: Bilateral cervical adenopathy right-sided swelling more prominent, area is nontender  Cardiovascular:      Rate and Rhythm: Normal rate and regular rhythm  Pulses: Normal pulses  Pulmonary:      Effort: Pulmonary effort is normal  No respiratory distress  Breath sounds: No stridor  Musculoskeletal:         General: No deformity  Normal range of motion  Cervical back: Normal range of motion and neck supple  No rigidity or tenderness  Skin:     General: Skin is warm and dry  Capillary Refill: Capillary refill takes less than 2 seconds  Neurological:      General: No focal deficit present  Mental Status: He is alert and oriented to person, place, and time     Psychiatric:         Mood and Affect: Mood normal          Behavior: Behavior normal          Vital Signs  ED Triage Vitals [08/24/22 2135]   Temp Pulse Respirations Blood Pressure SpO2   -- 98 18 (!) 165/108 99 %      Temp src Heart Rate Source Patient Position - Orthostatic VS BP Location FiO2 (%)   -- Monitor Lying Left arm --      Pain Score       No Pain           Vitals:    08/24/22 2135   BP: (!) 165/108   Pulse: 98   Patient Position - Orthostatic VS: Lying         Visual Acuity      ED Medications  Medications   iohexol (OMNIPAQUE) 350 MG/ML injection (MULTI-DOSE) 65 mL (65 mL Intravenous Given 8/25/22 0121)   clindamycin (CLEOCIN) capsule 450 mg (450 mg Oral Given 8/25/22 0223)       Diagnostic Studies  Results Reviewed     Procedure Component Value Units Date/Time    Manual Differential(PHLEBS Do Not Order) [[id number]]  (Abnormal) Collected: 08/25/22 0112    Lab Status: Final result Specimen: Blood from Arm, Right Updated: 08/25/22 0143     Segmented % 50 %      Bands % 1 %      Lymphocytes % 41 %      Monocytes % 6 %      Eosinophils, % 2 %      Basophils % 0 %      Absolute Neutrophils 7 25 Thousand/uL      Lymphocytes Absolute 5 83 Thousand/uL      Monocytes Absolute 0 85 Thousand/uL      Eosinophils Absolute 0 28 Thousand/uL      Basophils Absolute 0 00 Thousand/uL      Total Counted --     RBC Morphology Normal     Platelet Estimate Adequate    CBC and differential [[id number]]  (Abnormal) Collected: 08/25/22 0112    Lab Status: Final result Specimen: Blood from Arm, Right Updated: 08/25/22 0143     WBC 14 22 Thousand/uL      RBC 4 61 Million/uL      Hemoglobin 14 4 g/dL      Hematocrit 42 3 %      MCV 92 fL      MCH 31 2 pg      MCHC 34 0 g/dL      RDW 13 2 %      MPV 8 7 fL      Platelets 676 Thousands/uL     Narrative: This is an appended report  These results have been appended to a previously verified report  CT soft tissue neck with contrast   Final Result by Jame Benson MD (08/25 0202)      Mildly enlarged edematous appearing right submandibular gland when compared to the left with surrounding inflammatory stranding most compatible with an acute infectious or inflammatory sialadenitis  There is no evidence of a submandibular duct calculus  Workstation performed: [id number]         CT soft tissue neck wo contrast   Final Result by Jame Benson MD (08/24 2988)      Mild inflammatory stranding and fluid tracking along the right carotid space suspicious for an infectious or inflammatory process  Mild subcutaneous inflammatory stranding is also noted at the bilateral submandibular and submental spaces  There is no    focal fluid collection to suggest an abscess  Consider a repeat CT neck with contrast for further evaluation  Patent airway                 Workstation performed: [id number]         CT facial bones without contrast   Final Result by Noemí Lyons MD (08/24 2340)      Mild subcutaneous inflammatory stranding involving the partially visualized bilateral submandibular and submental region likely infectious or inflammatory in nature  Please refer to the report of a CT soft tissue neck performed at the same time for    additional details  No focal fluid collection to suggest an abscess  No acute osseous abnormality  Workstation performed: [id number]                    Procedures  Procedures         ED Course  ED Course as of 08/25/22 0410   Wed Aug 24, 2022   2211 2 attempts for IV access attempted by nurse without success, patient would like to avoid getting stuck again if possible and would prefer CT scan without contrast                                  SBIRT 22yo+    Flowsheet Row Most Recent Value   SBIRT (25 yo +)    In order to provide better care to our patients, we are screening all of our patients for alcohol and drug use  Would it be okay to ask you these screening questions? Yes Filed at: 08/24/2022 2137   Initial Alcohol Screen: US AUDIT-C     1  How often do you have a drink containing alcohol? 0 Filed at: 08/24/2022 2137   2  How many drinks containing alcohol do you have on a typical day you are drinking? 0 Filed at: 08/24/2022 2137   3a  Male UNDER 65: How often do you have five or more drinks on one occasion? 0 Filed at: 08/24/2022 2137   3b  FEMALE Any Age, or MALE 65+: How often do you have 4 or more drinks on one occassion? 0 Filed at: 08/24/2022 2137   Audit-C Score 0 Filed at: 08/24/2022 2137   SHAINA: How many times in the past year have you    Used an illegal drug or used a prescription medication for non-medical reasons? Never Filed at: 08/24/2022 2137                    MDM  Number of Diagnoses or Management Options  Neck swelling  Poor dentition  Sialadenitis  Diagnosis management comments: 55-year-old male presents the ED for evaluation of neck swelling    On exam he is well-appearing in no acute distress  Bilateral ears without any signs of infection, no mastoid tenderness, no facial tenderness, patient does exhibit poor dentition although no obvious abscess is present  Does have bilateral cervical adenopathy and right greater than left soft tissue neck swelling  Area is nontender  Trachea is midline  Will check basic labs and CT scan of facial bones and neck  After 2 attempts by nurse to obtain IV access, patient asks that we did not attempt any further at this time  He was okay to proceed with noncontrast CT imaging  I advised him that if necessary depending on the CT results, we may need to re-attempt IV access in order to obtain further imaging  Patient expressed understanding and was in agreement with the plan  Noncontrast CT showed inflammatory stranding and fluid tracking along the right carotid space  Repeat imaging with IV contrast was recommended  Patient was in agreement with the plan  Contrast scan demonstrated signs compatible with acute infectious or inflammatory sialadenitis  Will give dose of clindamycin here and discharge home with prescription for clindamycin  Patient also advised to use sialagogues  Was given strict return precautions  At the time of discharge, the area of swelling in his neck already has seemed to improve  He is tolerating secretions without difficulty  Hemodynamics remained stable        Disposition  Final diagnoses:   Sialadenitis   Poor dentition   Neck swelling     Time reflects when diagnosis was documented in both MDM as applicable and the Disposition within this note     Time User Action Codes Description Comment    8/25/2022  2:05 AM Check, Casi Delarosa Add [K11 20] Sialadenitis     8/25/2022  2:05 AM Check, Casi Delarosa Add [K08 9] Poor dentition     8/25/2022  2:05 AM Check, Casi Delarosa Add [R22 1] Neck swelling       ED Disposition     ED Disposition   Discharge    Condition   Stable    Date/Time   u Aug 25, 2022  2:10 AM    Comment   Maria R Brasher Amaya 141 discharge to home/self care  Follow-up Information     Follow up With Specialties Details Why Contact Info Additional [address]  Emergency Department Emergency Medicine  If symptoms worsen Clarita Stockton [address] [place] [id number] [phone] [facility] Emergency Department, [address], [address]    Christi Bedolla MD Family Medicine Schedule an appointment as soon as possible for a visit   Chava Maldonado [address] [facility]  [phone]             Discharge Medication List as of 8/25/2022  2:12 AM      START taking these medications    Details   clindamycin (CLEOCIN) 150 mg capsule Take 3 capsules (450 mg total) by mouth 3 (three) times a day for 10 days, Starting Thu 8/25/2022, Until Sun 9/4/2022, Normal         CONTINUE these medications which have NOT CHANGED    Details   ALPRAZolam (XANAX) 0 25 mg tablet Starting Thu 9/26/2019, Historical Med      escitalopram (LEXAPRO) 10 mg tablet Starting Fri 11/1/2019, Historical Med      lisinopril (ZESTRIL) 30 mg tablet Take by mouth, Historical Med      meloxicam (MOBIC) 15 mg tablet Take 1 tablet (15 mg total) by mouth daily, Starting Mon 8/2/2021, Normal      methocarbamol (ROBAXIN) 750 mg tablet Take 1 tablet (750 mg total) by mouth every 6 (six) hours as needed for muscle spasms, Starting Mon 8/2/2021, Normal      neomycin-polymyxin-hydrocortisone (CORTISPORIN) otic solution Administer 4 drops into both ears every 6 (six) hours for 7 days, Starting Wed 8/24/2022, Until Wed 8/31/2022, Normal             No discharge procedures on file      PDMP Review     None          ED Provider  Electronically Signed by           Dedra Jaeger MD  08/25/22 3539

## 2022-08-25 NOTE — DISCHARGE INSTRUCTIONS
Start on sialogogues (e g , sour lozenges, lemon juice), which stimulate salivary secretions and help expel the stone    Take clindamycin as prescribed for total of 10 days

## 2023-11-21 ENCOUNTER — TELEPHONE (OUTPATIENT)
Dept: FAMILY MEDICINE CLINIC | Facility: CLINIC | Age: 53
End: 2023-11-21

## 2023-11-21 NOTE — LETTER
December 13, 2023    [facility]  [place] [address]      Dear Mr. Jacqui Presley:    Please call to schedule an appointment today.          Sincerely,  Wayne Toscano MD

## 2023-12-06 NOTE — TELEPHONE ENCOUNTER
3RD CALL MADE TO PT.  LEFT A VM FOR PT TO CALL OFFICE BACK AND SCHEDULE AN AWV WITH DR Carolyn Rojas

## 2024-02-06 DIAGNOSIS — I10 ESSENTIAL HYPERTENSION: Primary | ICD-10-CM

## 2024-02-06 RX ORDER — LISINOPRIL 40 MG/1
40 TABLET ORAL DAILY
Qty: 30 TABLET | Refills: 5 | Status: SHIPPED | OUTPATIENT
Start: 2024-02-06

## 2024-09-09 ENCOUNTER — OFFICE VISIT (OUTPATIENT)
Dept: FAMILY MEDICINE CLINIC | Facility: CLINIC | Age: 54
End: 2024-09-09
Payer: COMMERCIAL

## 2024-09-09 VITALS
SYSTOLIC BLOOD PRESSURE: 132 MMHG | OXYGEN SATURATION: 98 % | HEART RATE: 81 BPM | HEIGHT: 67 IN | BODY MASS INDEX: 26.4 KG/M2 | TEMPERATURE: 97.8 F | DIASTOLIC BLOOD PRESSURE: 82 MMHG | WEIGHT: 168.2 LBS

## 2024-09-09 DIAGNOSIS — H61.23 BILATERAL IMPACTED CERUMEN: ICD-10-CM

## 2024-09-09 DIAGNOSIS — H91.92 DECREASED HEARING OF LEFT EAR: ICD-10-CM

## 2024-09-09 DIAGNOSIS — S91.109A OPEN WOUND OF TOE WITH COMPLICATION, INITIAL ENCOUNTER: Primary | ICD-10-CM

## 2024-09-09 PROCEDURE — 69210 REMOVE IMPACTED EAR WAX UNI: CPT | Performed by: NURSE PRACTITIONER

## 2024-09-09 PROCEDURE — 99214 OFFICE O/P EST MOD 30 MIN: CPT | Performed by: NURSE PRACTITIONER

## 2024-09-09 NOTE — PROGRESS NOTES
"Ear cerumen removal    Date/Time: 2024 3:20 PM    Performed by: MAYO Guerra  Authorized by: MAYO Guerra  Universal Protocol:  procedure performed by consultantConsent: Verbal consent obtained. Written consent not obtained.  Consent given by: patient  Patient understanding: patient states understanding of the procedure being performed  Patient identity confirmed: verbally with patient    Patient location:  Clinic  Indications / Diagnosis:  Cerumen impaction  Procedure details:     Local anesthetic:  None    Location:  L ear and R ear    Procedure type: irrigation with instrumentation      Instrumentation: curette      Approach:  External    Visualization (free text):  Large amount of cerumen  Post-procedure details:     Complication:  None    Hearing quality:  Improved    Patient tolerance of procedure:  Tolerated well, no immediate complications  Comments:      Follow-up as needed    Ambulatory Visit  Name: Augusto Hernandez      : 1970      MRN: [id number]  Encounter Provider: MAYO Guerra  Encounter Date: 2024   Encounter department: [facility]    Assessment & Plan   1. Open wound of toe with complication, initial encounter  Comments:  DSD ,\"donut pad\" to prevent pressure,antibx d/t ? mild infection,f/u if unresolved  2. Bilateral impacted cerumen  Comments:  debrox preventively  Orders:  -     Ear cerumen removal  3. Decreased hearing of left ear  Comments:  reSolved after flush     History of Present Illness     Patient states he dropped hot oil on his foot 2-1/2 weeks ago.  It is not healing.  It is on his right great dorsal toe wound admittedly is a spot that has a lot of pressure from his shoe every day.  It is mildly sore but still little red but he did notice that a piece of yellow material formed as a surface and then sloughed off  Also he has not been able to hear from his right ear for the last couple of days.  He has a history of " "cerumen and he did apply drops into his ear that were either Debrox or Cerumenex.  He did this in the evening to the left for the last 2 to 3 days.        Review of Systems    Objective     /82 (BP Location: Left arm, Patient Position: Sitting, Cuff Size: Large)   Pulse 81   Temp 97.8 °F (36.6 °C) (Tympanic)   Ht 5' 7\" (1.702 m)   Wt 76.3 kg (168 lb 3.2 oz)   SpO2 98%   BMI 26.34 kg/m²     Physical Exam  HENT:      Ears:      Comments: Ears completely occluded with cerumen post treatment there was improved hearing which normalized on the left.  Skin:     Comments: Right dorsal toe with approximately 0.8 cm divot surrounded by mild hyperemia mild tenderness       Administrative Statements           "

## 2025-01-13 ENCOUNTER — TELEPHONE (OUTPATIENT)
Age: 55
End: 2025-01-13

## 2025-01-13 NOTE — TELEPHONE ENCOUNTER
Pt called requesting an appointment. Pt c/o pain in sternum area, right breast around nipple x 2 weeks. Pt does not have any discharge. Earliest appointment with PCP was for Thursday, 1/16/25 at 8 am. Pt scheduled.

## 2025-01-16 ENCOUNTER — OFFICE VISIT (OUTPATIENT)
Dept: FAMILY MEDICINE CLINIC | Facility: CLINIC | Age: 55
End: 2025-01-16
Payer: COMMERCIAL

## 2025-01-16 VITALS
BODY MASS INDEX: 27.31 KG/M2 | HEART RATE: 74 BPM | TEMPERATURE: 97.1 F | OXYGEN SATURATION: 99 % | SYSTOLIC BLOOD PRESSURE: 138 MMHG | HEIGHT: 67 IN | WEIGHT: 174 LBS | DIASTOLIC BLOOD PRESSURE: 80 MMHG

## 2025-01-16 DIAGNOSIS — I10 ESSENTIAL HYPERTENSION: Primary | ICD-10-CM

## 2025-01-16 DIAGNOSIS — Z13.220 SCREENING CHOLESTEROL LEVEL: ICD-10-CM

## 2025-01-16 DIAGNOSIS — R53.83 OTHER FATIGUE: ICD-10-CM

## 2025-01-16 DIAGNOSIS — R07.89 CHEST WALL PAIN: ICD-10-CM

## 2025-01-16 DIAGNOSIS — Z12.11 SCREEN FOR COLON CANCER: ICD-10-CM

## 2025-01-16 DIAGNOSIS — N63.41 SUBAREOLAR MASS OF RIGHT BREAST: ICD-10-CM

## 2025-01-16 PROCEDURE — 99214 OFFICE O/P EST MOD 30 MIN: CPT | Performed by: NURSE PRACTITIONER

## 2025-01-16 NOTE — PROGRESS NOTES
"Name: Augusto Hernandez      : 1970      MRN: [id number]  Encounter Provider: MAYO Guerra  Encounter Date: 2025   Encounter department: [facility]  :  Assessment & Plan  Essential hypertension  Will check labs and see him back for well visit.  Orders:  •  Testosterone, free, total; Future  •  Comprehensive metabolic panel; Future  •  TSH + Free T4; Future    Subareolar mass of right breast    Orders:  •  US breast right limited (diagnostic); Future    Other fatigue    Orders:  •  CBC and differential; Future  •  Testosterone, free, total; Future  •  TSH + Free T4; Future    Screen for colon cancer    Orders:  •  Cologuard    Screening cholesterol level    Orders:  •  Lipid panel; Future    Chest wall pain    Orders:  •  XR chest pa and lateral; Future           History of Present Illness     C/o soreness in chest w/ pressure/touch.  Started a couple weeks ago.  He denies ever having a rash.  He did not feel ill.  He has continued to smoke but he has got himself reduced to a half a pack a day which was a big step for him.  He does not recall coughing recently.  He has 2 separate areas of concern.  So he has sternal pain that can be sore with pressure or with movement and he also has an area on his right breast that is a little tender and a little swollen.    No family history of breast cancer.  Occasionally little bit tired      Review of Systems    Objective   /80 (BP Location: Left arm, Patient Position: Sitting, Cuff Size: Standard)   Pulse 74   Temp (!) 97.1 °F (36.2 °C) (Tympanic)   Ht 5' 7\" (1.702 m)   Wt 78.9 kg (174 lb)   SpO2 99%   BMI 27.25 kg/m²      Physical Exam  Constitutional:       Appearance: He is not ill-appearing.   HENT:      Right Ear: Tympanic membrane normal.      Left Ear: Tympanic membrane normal.   Cardiovascular:      Rate and Rhythm: Normal rate and regular rhythm.   Pulmonary:      Effort: Pulmonary effort is normal.      Breath " sounds: Normal breath sounds.   Musculoskeletal:      Comments: Mild tenderness with palpation along sternum no mass or deformity palpated.  It is not severe.    Right anterior chest wall with some generalized tenderness and he does have a bit of a fullness lateral to areola medially at about 2:00   Skin:     Findings: Rash present.   Neurological:      Mental Status: He is alert.   Psychiatric:         Mood and Affect: Mood normal.

## 2025-01-17 ENCOUNTER — APPOINTMENT (OUTPATIENT)
Dept: RADIOLOGY | Facility: CLINIC | Age: 55
End: 2025-01-17
Payer: COMMERCIAL

## 2025-01-17 ENCOUNTER — APPOINTMENT (OUTPATIENT)
Age: 55
End: 2025-01-17
Payer: COMMERCIAL

## 2025-01-17 ENCOUNTER — RESULTS FOLLOW-UP (OUTPATIENT)
Dept: FAMILY MEDICINE CLINIC | Facility: CLINIC | Age: 55
End: 2025-01-17

## 2025-01-17 DIAGNOSIS — R53.83 OTHER FATIGUE: ICD-10-CM

## 2025-01-17 DIAGNOSIS — I10 ESSENTIAL HYPERTENSION: ICD-10-CM

## 2025-01-17 DIAGNOSIS — R07.89 CHEST WALL PAIN: ICD-10-CM

## 2025-01-17 DIAGNOSIS — Z13.220 SCREENING CHOLESTEROL LEVEL: ICD-10-CM

## 2025-01-17 LAB
ALBUMIN SERPL BCG-MCNC: 4.5 G/DL (ref 3.5–5)
ALP SERPL-CCNC: 69 U/L (ref 34–104)
ALT SERPL W P-5'-P-CCNC: 12 U/L (ref 7–52)
ANION GAP SERPL CALCULATED.3IONS-SCNC: 6 MMOL/L (ref 4–13)
AST SERPL W P-5'-P-CCNC: 15 U/L (ref 13–39)
BASOPHILS # BLD AUTO: 0.08 THOUSANDS/ΜL (ref 0–0.1)
BASOPHILS NFR BLD AUTO: 1 % (ref 0–1)
BILIRUB SERPL-MCNC: 0.71 MG/DL (ref 0.2–1)
BUN SERPL-MCNC: 10 MG/DL (ref 5–25)
CALCIUM SERPL-MCNC: 9.7 MG/DL (ref 8.4–10.2)
CHLORIDE SERPL-SCNC: 105 MMOL/L (ref 96–108)
CHOLEST SERPL-MCNC: 203 MG/DL (ref ?–200)
CO2 SERPL-SCNC: 31 MMOL/L (ref 21–32)
CREAT SERPL-MCNC: 0.96 MG/DL (ref 0.6–1.3)
EOSINOPHIL # BLD AUTO: 0.27 THOUSAND/ΜL (ref 0–0.61)
EOSINOPHIL NFR BLD AUTO: 2 % (ref 0–6)
ERYTHROCYTE [DISTWIDTH] IN BLOOD BY AUTOMATED COUNT: 12.8 % (ref 11.6–15.1)
GFR SERPL CREATININE-BSD FRML MDRD: 89 ML/MIN/1.73SQ M
GLUCOSE P FAST SERPL-MCNC: 100 MG/DL (ref 65–99)
HCT VFR BLD AUTO: 51.2 % (ref 36.5–49.3)
HDLC SERPL-MCNC: 47 MG/DL
HGB BLD-MCNC: 16.6 G/DL (ref 12–17)
IMM GRANULOCYTES # BLD AUTO: 0.03 THOUSAND/UL (ref 0–0.2)
IMM GRANULOCYTES NFR BLD AUTO: 0 % (ref 0–2)
LDLC SERPL CALC-MCNC: 132 MG/DL (ref 0–100)
LYMPHOCYTES # BLD AUTO: 5.24 THOUSANDS/ΜL (ref 0.6–4.47)
LYMPHOCYTES NFR BLD AUTO: 38 % (ref 14–44)
MCH RBC QN AUTO: 30 PG (ref 26.8–34.3)
MCHC RBC AUTO-ENTMCNC: 32.4 G/DL (ref 31.4–37.4)
MCV RBC AUTO: 92 FL (ref 82–98)
MONOCYTES # BLD AUTO: 1.03 THOUSAND/ΜL (ref 0.17–1.22)
MONOCYTES NFR BLD AUTO: 7 % (ref 4–12)
NEUTROPHILS # BLD AUTO: 7.19 THOUSANDS/ΜL (ref 1.85–7.62)
NEUTS SEG NFR BLD AUTO: 52 % (ref 43–75)
NONHDLC SERPL-MCNC: 156 MG/DL
NRBC BLD AUTO-RTO: 0 /100 WBCS
PLATELET # BLD AUTO: 384 THOUSANDS/UL (ref 149–390)
PMV BLD AUTO: 9.2 FL (ref 8.9–12.7)
POTASSIUM SERPL-SCNC: 5.2 MMOL/L (ref 3.5–5.3)
PROT SERPL-MCNC: 7.8 G/DL (ref 6.4–8.4)
RBC # BLD AUTO: 5.54 MILLION/UL (ref 3.88–5.62)
SODIUM SERPL-SCNC: 142 MMOL/L (ref 135–147)
T4 FREE SERPL-MCNC: 0.84 NG/DL (ref 0.61–1.12)
TRIGL SERPL-MCNC: 121 MG/DL (ref ?–150)
TSH SERPL DL<=0.05 MIU/L-ACNC: 1.33 UIU/ML (ref 0.45–4.5)
WBC # BLD AUTO: 13.84 THOUSAND/UL (ref 4.31–10.16)

## 2025-01-17 PROCEDURE — 85025 COMPLETE CBC W/AUTO DIFF WBC: CPT

## 2025-01-17 PROCEDURE — 80061 LIPID PANEL: CPT

## 2025-01-17 PROCEDURE — 84439 ASSAY OF FREE THYROXINE: CPT

## 2025-01-17 PROCEDURE — 36415 COLL VENOUS BLD VENIPUNCTURE: CPT

## 2025-01-17 PROCEDURE — 84403 ASSAY OF TOTAL TESTOSTERONE: CPT

## 2025-01-17 PROCEDURE — 84402 ASSAY OF FREE TESTOSTERONE: CPT

## 2025-01-17 PROCEDURE — 71046 X-RAY EXAM CHEST 2 VIEWS: CPT

## 2025-01-17 PROCEDURE — 80053 COMPREHEN METABOLIC PANEL: CPT

## 2025-01-17 PROCEDURE — 84443 ASSAY THYROID STIM HORMONE: CPT

## 2025-01-19 LAB
TESTOST FREE SERPL-MCNC: 13.1 PG/ML (ref 7.2–24)
TESTOST SERPL-MCNC: 1036 NG/DL (ref 264–916)

## 2025-01-20 NOTE — RESULT ENCOUNTER NOTE
Notify patient that there were a few abnormalities in his blood work.  His testosterone actually was elevated not reduced.  This is an unusual finding and I will need him to sit down with an endocrinologist to sort it  out.  May be just a mild finding that does not really amount to much of an issue but we will let them decide that.  Secondly his white count was a little bit elevated.  I see that happened once before to him and and actually is stable from that time.  I want to recheck this in 6 months I think that we should keep tabs on this for at least twice a year.  It is a very mild abnormality but we keep tabs on it to make sure that nothing would progress and indicate that there was an issue with his blood cells.

## 2025-01-21 DIAGNOSIS — R79.89 ELEVATED TESTOSTERONE LEVEL: Primary | ICD-10-CM

## 2025-01-21 NOTE — TELEPHONE ENCOUNTER
Pt called back, reason for white  count may be because he has to have teeth pulled, waiting on having enough money to pay for this.

## 2025-01-30 ENCOUNTER — RA CDI HCC (OUTPATIENT)
Dept: OTHER | Facility: HOSPITAL | Age: 55
End: 2025-01-30

## 2025-02-04 DIAGNOSIS — I10 ESSENTIAL HYPERTENSION: ICD-10-CM

## 2025-02-04 RX ORDER — LISINOPRIL 40 MG/1
40 TABLET ORAL DAILY
Qty: 90 TABLET | Refills: 1 | Status: SHIPPED | OUTPATIENT
Start: 2025-02-04

## 2025-02-11 ENCOUNTER — TELEPHONE (OUTPATIENT)
Dept: FAMILY MEDICINE CLINIC | Facility: CLINIC | Age: 55
End: 2025-02-11

## 2025-02-27 ENCOUNTER — OFFICE VISIT (OUTPATIENT)
Dept: FAMILY MEDICINE CLINIC | Facility: CLINIC | Age: 55
End: 2025-02-27
Payer: COMMERCIAL

## 2025-02-27 ENCOUNTER — CONSULT (OUTPATIENT)
Dept: ENDOCRINOLOGY | Facility: CLINIC | Age: 55
End: 2025-02-27
Payer: COMMERCIAL

## 2025-02-27 VITALS
HEART RATE: 80 BPM | SYSTOLIC BLOOD PRESSURE: 122 MMHG | DIASTOLIC BLOOD PRESSURE: 80 MMHG | TEMPERATURE: 97.4 F | WEIGHT: 179 LBS | OXYGEN SATURATION: 96 % | HEIGHT: 67 IN | BODY MASS INDEX: 28.09 KG/M2

## 2025-02-27 VITALS
WEIGHT: 178.6 LBS | HEIGHT: 67 IN | HEART RATE: 78 BPM | SYSTOLIC BLOOD PRESSURE: 138 MMHG | TEMPERATURE: 98.5 F | DIASTOLIC BLOOD PRESSURE: 76 MMHG | OXYGEN SATURATION: 98 % | RESPIRATION RATE: 18 BRPM | BODY MASS INDEX: 28.03 KG/M2

## 2025-02-27 DIAGNOSIS — N63.41 SUBAREOLAR MASS OF RIGHT BREAST: ICD-10-CM

## 2025-02-27 DIAGNOSIS — D72.829 LEUKOCYTOSIS, UNSPECIFIED TYPE: ICD-10-CM

## 2025-02-27 DIAGNOSIS — Z00.00 WELL ADULT EXAM: Primary | ICD-10-CM

## 2025-02-27 DIAGNOSIS — R73.09 ELEVATED GLUCOSE: ICD-10-CM

## 2025-02-27 DIAGNOSIS — R79.89 ELEVATED TESTOSTERONE LEVEL: ICD-10-CM

## 2025-02-27 DIAGNOSIS — F17.200 TOBACCO USE DISORDER: ICD-10-CM

## 2025-02-27 PROCEDURE — 99396 PREV VISIT EST AGE 40-64: CPT | Performed by: NURSE PRACTITIONER

## 2025-02-27 PROCEDURE — 99204 OFFICE O/P NEW MOD 45 MIN: CPT | Performed by: STUDENT IN AN ORGANIZED HEALTH CARE EDUCATION/TRAINING PROGRAM

## 2025-02-27 NOTE — PROGRESS NOTES
"Name: Augusto Hernandez      : 1970      MRN: [id number]  Encounter Provider: Sujatha Quinn MD  Encounter Date: 2025   Encounter department: [facility] [place]  :  Assessment & Plan  Elevated testosterone level    Augusto was evaluated for fatigue, for possible low testosterone although showed elevated total testosterone of thousand 36 ng/dl ( 264 - 916) although with normal free testosterone 13.1 pg/ml ( 7.2 - 24.0).  He does not have any stigmata of hyperandrogenism.  We discussed differential diagnosis, no prior anabolic steroid use, given normal free testosterone, SHBG abnormalities should be considered, hyperthyroidism was ruled out, no history of liver disease which are common cause of elevated SHBG.  I repeated free and total testosterone, ordered sex hormone binding globulin as well as FSH and LH, further evaluation pending the results.  Discussed labs finding, pituitary-testicular axis function.    Orders:    Ambulatory Referral to Endocrinology    Testosterone, Free/Tot Equilib; Future    FSH and LH; Future    Sex Hormone Binding Globulin; Future    DHEA-sulfate; Future        History of Present Illness   HPI  Augusto Hernandez is a 54 y.o. male who presents for evaluation of elevated testosterone at the request of MAYO Guerra.    He was evaluated for fatigue for which testosterone was checked and showed\"    Component      Latest Ref Rng 2025   TESTOSTERONE FREE      7.2 - 24.0 pg/mL 13.1    Testosterone, Total, LC/MS      264 - 916 ng/dL 1036 (H)       Acne or oily skin: no  Hair loss: no  Increase muscle mass: no  Deepening of voice: no  Increased libido: no  Increased irritability: no  Hx of hyperthyroidism :no  Hx of liver disease: no  Anabolic steroid us: no      History obtained from: patient    Review of Systems :  negative except as mentioned in HPI.    Medical History Reviewed by provider this encounter:     .  Current Outpatient " "Medications on File Prior to Visit   Medication Sig Dispense Refill    lisinopril (ZESTRIL) 40 mg tablet TAKE 1 TABLET DAILY. 90 tablet 1    ALPRAZolam (XANAX) 0.25 mg tablet  (Patient not taking: Reported on 2/27/2025)      escitalopram (LEXAPRO) 10 mg tablet  (Patient not taking: Reported on 8/24/2022)      lisinopril (ZESTRIL) 30 mg tablet Take by mouth (Patient not taking: Reported on 9/9/2024)      meloxicam (MOBIC) 15 mg tablet Take 1 tablet (15 mg total) by mouth daily (Patient not taking: Reported on 8/24/2022) 14 tablet 0    methocarbamol (ROBAXIN) 750 mg tablet Take 1 tablet (750 mg total) by mouth every 6 (six) hours as needed for muscle spasms (Patient not taking: Reported on 8/24/2022) 30 tablet 0    neomycin-polymyxin-hydrocortisone (CORTISPORIN) otic solution Administer 4 drops into both ears every 6 (six) hours for 7 days 10 mL 0     No current facility-administered medications on file prior to visit.         Objective   /76 (BP Location: Left arm, Patient Position: Sitting, Cuff Size: Adult)   Pulse 78   Temp 98.5 °F (36.9 °C) (Temporal)   Resp 18   Ht 5' 7\" (1.702 m)   Wt 81 kg (178 lb 9.6 oz)   SpO2 98%   BMI 27.97 kg/m²      Physical Exam  Vitals and nursing note reviewed.   Constitutional:       General: He is not in acute distress.     Appearance: He is well-developed.   HENT:      Head: Normocephalic and atraumatic.   Eyes:      Conjunctiva/sclera: Conjunctivae normal.   Cardiovascular:      Rate and Rhythm: Normal rate and regular rhythm.      Heart sounds: No murmur heard.  Pulmonary:      Effort: Pulmonary effort is normal. No respiratory distress.      Breath sounds: Normal breath sounds.   Musculoskeletal:         General: No swelling.      Cervical back: Neck supple.   Skin:     General: Skin is warm and dry.   Neurological:      Mental Status: He is alert.   Psychiatric:         Mood and Affect: Mood normal.         Administrative Statements   I have spent a total time of " 35 minutes in caring for this patient on the day of the visit/encounter including Diagnostic results, Prognosis, Risks and benefits of tx options, Instructions for management, Impressions, Counseling / Coordination of care, Documenting in the medical record, Reviewing/placing orders in the medical record (including tests, medications, and/or procedures), and Obtaining or reviewing history  .    Component      Latest Ref Rng 1/17/2025   TESTOSTERONE FREE      7.2 - 24.0 pg/mL 13.1    Testosterone, Total, LC/MS      264 - 916 ng/dL 1036 (H)    TSH 3RD GENERATON      0.450 - 4.500 uIU/mL 1.333    FREE T4      0.61 - 1.12 ng/dL 0.84       Legend:  (H) High

## 2025-02-27 NOTE — PROGRESS NOTES
"Name: Augusto Hernandez      : 1970      MRN: [id number]  Encounter Provider: MAYO Guerra  Encounter Date: 2025   Encounter department: [facility]  :  Assessment & Plan  Well adult exam  Discussed diet smoking cessation exercise     Discussed diet continue B12 supplement  Leukocytosis, unspecified type    Orders:  •  CBC and differential; Future  Recheck 6 months  Elevated glucose  Recheck 6 months  Orders:  •  Basic metabolic panel; Future  •  Hemoglobin A1C; Future    Subareolar mass of right breast  Please follow through with imaging       Elevated testosterone level  Continue following with endocrinologist       Tobacco use disorder  Cessation greatly encouraged.  He is down to half a pack and he is not ready to proceed any further, I did discuss increased risk of various cancers.              History of Present Illness   Patient is here for a well visit.  Recently had some inflammation in his breast, thought it was improved so he was not sure he was going to follow through with the ultrasound that I ordered.  Radiology did want to add a mammogram as well.  He does not want to have these done.  Used to smoke about a half a pack a day.  He states he is physically active on his feet all day and hikes and is in the warmer weather and denies chest pain shortness of breath or syncope.  We did discuss risk of coronary artery disease and discussed his family history.  He did receive a Cologuard he does not want a colonoscopy.  He did see endocrine for his elevated testosterone.      Review of Systems    Objective   /80 (BP Location: Left arm, Patient Position: Sitting, Cuff Size: Adult)   Pulse 80   Temp (!) 97.4 °F (36.3 °C) (Tympanic)   Ht 5' 7\" (1.702 m)   Wt 81.2 kg (179 lb)   SpO2 96%   BMI 28.04 kg/m²      Physical Exam  Cardiovascular:      Rate and Rhythm: Normal rate and regular rhythm.   Pulmonary:      Effort: Pulmonary effort is normal.      Breath " sounds: Normal breath sounds.   Chest:          Comments: Still with small palpable mass, breast in general seems less swollen  Abdominal:      Palpations: Abdomen is soft. There is no mass.      Tenderness: There is no abdominal tenderness.   Musculoskeletal:      Right lower leg: No edema.      Left lower leg: No edema.   Lymphadenopathy:      Cervical: No cervical adenopathy.   Neurological:      Mental Status: He is alert.   Psychiatric:         Mood and Affect: Mood normal.

## 2025-04-02 ENCOUNTER — TELEPHONE (OUTPATIENT)
Dept: FAMILY MEDICINE CLINIC | Facility: CLINIC | Age: 55
End: 2025-04-02